# Patient Record
Sex: FEMALE | Race: OTHER | Employment: PART TIME | URBAN - METROPOLITAN AREA
[De-identification: names, ages, dates, MRNs, and addresses within clinical notes are randomized per-mention and may not be internally consistent; named-entity substitution may affect disease eponyms.]

---

## 2017-05-11 ENCOUNTER — HOSPITAL ENCOUNTER (EMERGENCY)
Age: 27
Discharge: HOME | End: 2017-05-11
Attending: EMERGENCY MEDICINE
Payer: SUBSIDIZED

## 2017-05-11 VITALS
HEIGHT: 67 IN | TEMPERATURE: 98.9 F | BODY MASS INDEX: 21.97 KG/M2 | WEIGHT: 140 LBS | DIASTOLIC BLOOD PRESSURE: 54 MMHG | HEART RATE: 98 BPM | RESPIRATION RATE: 18 BRPM | OXYGEN SATURATION: 99 % | SYSTOLIC BLOOD PRESSURE: 100 MMHG

## 2017-05-11 DIAGNOSIS — R50.9 FEVER, UNSPECIFIED FEVER CAUSE: ICD-10-CM

## 2017-05-11 DIAGNOSIS — R51.9 NONINTRACTABLE HEADACHE, UNSPECIFIED CHRONICITY PATTERN, UNSPECIFIED HEADACHE TYPE: Primary | ICD-10-CM

## 2017-05-11 DIAGNOSIS — N30.01 ACUTE CYSTITIS WITH HEMATURIA: ICD-10-CM

## 2017-05-11 LAB
ALBUMIN SERPL BCP-MCNC: 3.7 G/DL (ref 3.5–5)
ALBUMIN/GLOB SERPL: 1 {RATIO} (ref 1.1–2.2)
ALP SERPL-CCNC: 65 U/L (ref 45–117)
ALT SERPL-CCNC: 19 U/L (ref 12–78)
ANION GAP BLD CALC-SCNC: 11 MMOL/L (ref 5–15)
APPEARANCE UR: ABNORMAL
AST SERPL W P-5'-P-CCNC: 9 U/L (ref 15–37)
BACTERIA URNS QL MICRO: ABNORMAL /HPF
BASOPHILS # BLD AUTO: 0 K/UL (ref 0–0.1)
BASOPHILS # BLD: 0 % (ref 0–1)
BILIRUB SERPL-MCNC: 0.6 MG/DL (ref 0.2–1)
BILIRUB UR QL: NEGATIVE
BUN SERPL-MCNC: 12 MG/DL (ref 6–20)
BUN/CREAT SERPL: 16 (ref 12–20)
CALCIUM SERPL-MCNC: 8 MG/DL (ref 8.5–10.1)
CHLORIDE SERPL-SCNC: 104 MMOL/L (ref 97–108)
CO2 SERPL-SCNC: 23 MMOL/L (ref 21–32)
COLOR UR: ABNORMAL
CREAT SERPL-MCNC: 0.77 MG/DL (ref 0.55–1.02)
EOSINOPHIL # BLD: 0 K/UL (ref 0–0.4)
EOSINOPHIL NFR BLD: 0 % (ref 0–7)
EPITH CASTS URNS QL MICRO: ABNORMAL /LPF
ERYTHROCYTE [DISTWIDTH] IN BLOOD BY AUTOMATED COUNT: 12.8 % (ref 11.5–14.5)
GLOBULIN SER CALC-MCNC: 3.7 G/DL (ref 2–4)
GLUCOSE SERPL-MCNC: 126 MG/DL (ref 65–100)
GLUCOSE UR STRIP.AUTO-MCNC: NEGATIVE MG/DL
HCG UR QL: NEGATIVE
HCT VFR BLD AUTO: 39.2 % (ref 35–47)
HGB BLD-MCNC: 13.1 G/DL (ref 11.5–16)
HGB UR QL STRIP: ABNORMAL
KETONES UR QL STRIP.AUTO: ABNORMAL MG/DL
LEUKOCYTE ESTERASE UR QL STRIP.AUTO: ABNORMAL
LIPASE SERPL-CCNC: 118 U/L (ref 73–393)
LYMPHOCYTES # BLD AUTO: 9 % (ref 12–49)
LYMPHOCYTES # BLD: 1 K/UL (ref 0.8–3.5)
MCH RBC QN AUTO: 27.9 PG (ref 26–34)
MCHC RBC AUTO-ENTMCNC: 33.4 G/DL (ref 30–36.5)
MCV RBC AUTO: 83.6 FL (ref 80–99)
MONOCYTES # BLD: 0.4 K/UL (ref 0–1)
MONOCYTES NFR BLD AUTO: 4 % (ref 5–13)
NEUTS SEG # BLD: 8.8 K/UL (ref 1.8–8)
NEUTS SEG NFR BLD AUTO: 87 % (ref 32–75)
NITRITE UR QL STRIP.AUTO: POSITIVE
PH UR STRIP: 5.5 [PH] (ref 5–8)
PLATELET # BLD AUTO: 230 K/UL (ref 150–400)
POTASSIUM SERPL-SCNC: 3.5 MMOL/L (ref 3.5–5.1)
PROT SERPL-MCNC: 7.4 G/DL (ref 6.4–8.2)
PROT UR STRIP-MCNC: ABNORMAL MG/DL
RBC # BLD AUTO: 4.69 M/UL (ref 3.8–5.2)
RBC #/AREA URNS HPF: ABNORMAL /HPF (ref 0–5)
SODIUM SERPL-SCNC: 138 MMOL/L (ref 136–145)
SP GR UR REFRACTOMETRY: >1.03 (ref 1–1.03)
UROBILINOGEN UR QL STRIP.AUTO: 0.2 EU/DL (ref 0.2–1)
WBC # BLD AUTO: 10.2 K/UL (ref 3.6–11)
WBC URNS QL MICRO: ABNORMAL /HPF (ref 0–4)

## 2017-05-11 PROCEDURE — 81001 URINALYSIS AUTO W/SCOPE: CPT | Performed by: EMERGENCY MEDICINE

## 2017-05-11 PROCEDURE — 85025 COMPLETE CBC W/AUTO DIFF WBC: CPT | Performed by: EMERGENCY MEDICINE

## 2017-05-11 PROCEDURE — 96365 THER/PROPH/DIAG IV INF INIT: CPT

## 2017-05-11 PROCEDURE — 96361 HYDRATE IV INFUSION ADD-ON: CPT

## 2017-05-11 PROCEDURE — 99285 EMERGENCY DEPT VISIT HI MDM: CPT

## 2017-05-11 PROCEDURE — 74011000258 HC RX REV CODE- 258: Performed by: EMERGENCY MEDICINE

## 2017-05-11 PROCEDURE — 83690 ASSAY OF LIPASE: CPT | Performed by: EMERGENCY MEDICINE

## 2017-05-11 PROCEDURE — 74011250637 HC RX REV CODE- 250/637: Performed by: EMERGENCY MEDICINE

## 2017-05-11 PROCEDURE — 80053 COMPREHEN METABOLIC PANEL: CPT | Performed by: EMERGENCY MEDICINE

## 2017-05-11 PROCEDURE — 36415 COLL VENOUS BLD VENIPUNCTURE: CPT | Performed by: EMERGENCY MEDICINE

## 2017-05-11 PROCEDURE — 81025 URINE PREGNANCY TEST: CPT

## 2017-05-11 PROCEDURE — 96375 TX/PRO/DX INJ NEW DRUG ADDON: CPT

## 2017-05-11 PROCEDURE — 74011250636 HC RX REV CODE- 250/636: Performed by: EMERGENCY MEDICINE

## 2017-05-11 RX ORDER — SODIUM CHLORIDE 0.9 % (FLUSH) 0.9 %
5-10 SYRINGE (ML) INJECTION AS NEEDED
Status: DISCONTINUED | OUTPATIENT
Start: 2017-05-11 | End: 2017-05-12 | Stop reason: HOSPADM

## 2017-05-11 RX ORDER — ONDANSETRON 2 MG/ML
4 INJECTION INTRAMUSCULAR; INTRAVENOUS
Status: COMPLETED | OUTPATIENT
Start: 2017-05-11 | End: 2017-05-11

## 2017-05-11 RX ORDER — KETOROLAC TROMETHAMINE 30 MG/ML
30 INJECTION, SOLUTION INTRAMUSCULAR; INTRAVENOUS
Status: COMPLETED | OUTPATIENT
Start: 2017-05-11 | End: 2017-05-11

## 2017-05-11 RX ORDER — ONDANSETRON 4 MG/1
4 TABLET, ORALLY DISINTEGRATING ORAL
Qty: 10 TAB | Refills: 0 | Status: SHIPPED | OUTPATIENT
Start: 2017-05-11 | End: 2021-10-11

## 2017-05-11 RX ORDER — ACETAMINOPHEN 500 MG
1000 TABLET ORAL
Status: COMPLETED | OUTPATIENT
Start: 2017-05-11 | End: 2017-05-11

## 2017-05-11 RX ORDER — CEPHALEXIN 500 MG/1
500 CAPSULE ORAL 3 TIMES DAILY
Qty: 21 CAP | Refills: 0 | Status: SHIPPED | OUTPATIENT
Start: 2017-05-11 | End: 2017-05-18

## 2017-05-11 RX ORDER — BUTALBITAL, ASPIRIN, AND CAFFEINE 325; 50; 40 MG/1; MG/1; MG/1
1 CAPSULE ORAL
Qty: 20 CAP | Refills: 0 | Status: SHIPPED | OUTPATIENT
Start: 2017-05-11 | End: 2017-08-09

## 2017-05-11 RX ORDER — SODIUM CHLORIDE 0.9 % (FLUSH) 0.9 %
5-10 SYRINGE (ML) INJECTION EVERY 8 HOURS
Status: DISCONTINUED | OUTPATIENT
Start: 2017-05-11 | End: 2017-05-12 | Stop reason: HOSPADM

## 2017-05-11 RX ADMIN — KETOROLAC TROMETHAMINE 30 MG: 30 INJECTION, SOLUTION INTRAMUSCULAR at 22:19

## 2017-05-11 RX ADMIN — CEFTRIAXONE SODIUM 1 G: 1 INJECTION, POWDER, FOR SOLUTION INTRAMUSCULAR; INTRAVENOUS at 23:14

## 2017-05-11 RX ADMIN — ACETAMINOPHEN 1000 MG: 500 TABLET ORAL at 21:47

## 2017-05-11 RX ADMIN — SODIUM CHLORIDE 1000 ML: 900 INJECTION, SOLUTION INTRAVENOUS at 22:18

## 2017-05-11 RX ADMIN — ONDANSETRON 4 MG: 2 INJECTION INTRAMUSCULAR; INTRAVENOUS at 22:19

## 2017-05-12 NOTE — DISCHARGE INSTRUCTIONS
We hope that we have addressed all of your medical concerns. The examination and treatment you received in the Emergency Department were for an emergent problem and were not intended as complete care. It is important that you follow up with your healthcare provider(s) for ongoing care. If your symptoms worsen or do not improve as expected, and you are unable to reach your usual health care provider(s), you should return to the Emergency Department. Today's healthcare is undergoing tremendous change, and patient satisfaction surveys are one of the many tools to assess the quality of medical care. You may receive a survey from the [employer] regarding your experience in the Emergency Department. I hope that your experience has been completely positive, particularly the medical care that I provided. As such, please participate in the survey; anything less than excellent does not meet my expectations or intentions. [facility] and [address] [place] [address] participate in nationally recognized quality of care measures. If your blood pressure is greater than 120/80, as reported below, we urge that you seek medical care to address the potential of high blood pressure, commonly known as hypertension. Hypertension can be hereditary or can be caused by certain medical conditions, pain, stress, or \"white coat syndrome. \"       Please make an appointment with your health care provider(s) for follow up of your Emergency Department visit. VITALS:   Patient Vitals for the past 8 hrs:   Temp Pulse Resp BP SpO2   05/11/17 2146 - - 22 114/63 99 %   05/11/17 2125 (!) 100.6 °F (38.1 °C) (!) 111 18 (!) 219/133 97 %          Thank you for allowing us to provide you with medical care today. We realize that you have many choices for your emergency care needs. Please choose us in the future for any continued health care needs. Sarah Thibodeaux Rita Islas [address] [place] [address].   Office: [phone]            Recent Results (from the past 24 hour(s))   URINALYSIS W/MICROSCOPIC    Collection Time: 05/11/17 10:06 PM   Result Value Ref Range    Color RED      Appearance CLOUDY (A) CLEAR      Specific gravity >1.030 (H) 1.003 - 1.030    pH (UA) 5.5 5.0 - 8.0      Protein TRACE (A) NEG mg/dL    Glucose NEGATIVE  NEG mg/dL    Ketone TRACE (A) NEG mg/dL    Bilirubin NEGATIVE  NEG      Blood LARGE (A) NEG      Urobilinogen 0.2 0.2 - 1.0 EU/dL    Nitrites POSITIVE (A) NEG      Leukocyte Esterase SMALL (A) NEG      WBC 5-10 0 - 4 /hpf    RBC  0 - 5 /hpf    Epithelial cells FEW FEW /lpf    Bacteria 1+ (A) NEG /hpf   HCG URINE, QL. - POC    Collection Time: 05/11/17 10:08 PM   Result Value Ref Range    Pregnancy test,urine (POC) NEGATIVE  NEG     CBC WITH AUTOMATED DIFF    Collection Time: 05/11/17 10:20 PM   Result Value Ref Range    WBC 10.2 3.6 - 11.0 K/uL    RBC 4.69 3.80 - 5.20 M/uL    HGB 13.1 11.5 - 16.0 g/dL    HCT 39.2 35.0 - 47.0 %    MCV 83.6 80.0 - 99.0 FL    MCH 27.9 26.0 - 34.0 PG    MCHC 33.4 30.0 - 36.5 g/dL    RDW 12.8 11.5 - 14.5 %    PLATELET 266 940 - 513 K/uL    NEUTROPHILS 87 (H) 32 - 75 %    LYMPHOCYTES 9 (L) 12 - 49 %    MONOCYTES 4 (L) 5 - 13 %    EOSINOPHILS 0 0 - 7 %    BASOPHILS 0 0 - 1 %    ABS. NEUTROPHILS 8.8 (H) 1.8 - 8.0 K/UL    ABS. LYMPHOCYTES 1.0 0.8 - 3.5 K/UL    ABS. MONOCYTES 0.4 0.0 - 1.0 K/UL    ABS. EOSINOPHILS 0.0 0.0 - 0.4 K/UL    ABS.  BASOPHILS 0.0 0.0 - 0.1 K/UL   METABOLIC PANEL, COMPREHENSIVE    Collection Time: 05/11/17 10:20 PM   Result Value Ref Range    Sodium 138 136 - 145 mmol/L    Potassium 3.5 3.5 - 5.1 mmol/L    Chloride 104 97 - 108 mmol/L    CO2 23 21 - 32 mmol/L    Anion gap 11 5 - 15 mmol/L    Glucose 126 (H) 65 - 100 mg/dL    BUN 12 6 - 20 MG/DL    Creatinine 0.77 0.55 - 1.02 MG/DL    BUN/Creatinine ratio 16 12 - 20      GFR est AA >60 >60 ml/min/1.73m2    GFR est non-AA >60 >60 ml/min/1.73m2    Calcium 8.0 (L) 8.5 - 10.1 MG/DL    Bilirubin, total 0.6 0.2 - 1.0 MG/DL    ALT (SGPT) 19 12 - 78 U/L    AST (SGOT) 9 (L) 15 - 37 U/L    Alk. phosphatase 65 45 - 117 U/L    Protein, total 7.4 6.4 - 8.2 g/dL    Albumin 3.7 3.5 - 5.0 g/dL    Globulin 3.7 2.0 - 4.0 g/dL    A-G Ratio 1.0 (L) 1.1 - 2.2     LIPASE    Collection Time: 05/11/17 10:20 PM   Result Value Ref Range    Lipase 118 73 - 393 U/L       No results found. Dolor de savita: Instrucciones de cuidado - [ Headache: Care Instructions ]  Instrucciones de cuidado    Los ishmael de savita tienen muchas causas posibles. La mayoría de los ishmael de savita no son señal de un problema más jovan y mejoran por sí solos. El tratamiento en el [facility] podría ayudarlo a sentirse mejor con Jose Eduardo Pace. El médico lo bobo revisado minuciosamente, guillermo puede desarrollar problemas más tarde. Si nota algún problema o síntomas, busque tratamiento médico inmediatamente. La atención de seguimiento es dani parte clave de nieves tratamiento y seguridad. Asegúrese de hacer y acudir a todas las citas, y llame a nieves médico si está teniendo problemas. También es dani buena idea saber los resultados de los exámenes y mantener dani lista de los medicamentos que jamie. ¿Cómo puede cuidarse en el [facility]? · No conduzca si ha tomado analgésicos (medicamentos para el dolor) recetados. · Descanse en un cuarto tranquilo y oscuro hasta que desaparezca el dolor de [place]. Cierre los ojos y trate de relajarse o dormirse. No marciano la televisión ni glo. · Colóquese un paño frío y húmedo o [place] compresa fría sobre la tricia adolorida de 10 a 21 minutos cada vez. Póngase un paño ng entre la compresa fría y la piel.   · Utilice dani toalla húmeda tibia o dani almohadilla térmica ajustada a baja temperatura para relajar los músculos tensos del tara y los hombros.  · [place]ídale a alguien que le latricia masajes suaves en el tara y los hombros.  · [place] los analgésicos exactamente palak le fueron indicados. ¨ Si el médico le recetó un analgésico, tómelo según las indicaciones. ¨ Si no está tomando un analgésico recetado, pregúntele a nieves médico si puede rose jazmine de [employer]. · Tenga cuidado de no rose analgésicos con mayor frecuencia que la permitida en las indicaciones porque los ishmael de savita podrían empeorar o aparecer con mayor frecuencia dani vez que el medicamento pierda nieves [place]. · Preste atención a los nuevos síntomas que aparecen con el dolor de [place], [place], debilidad o entumecimiento, cambios en la visión o confusión. Podrían ser señales de un problema más grave. Para prevenir los ishmael de savita  · [employer] un diario de lior ishmael de savita para que pueda averiguar qué los desencadena. Evitar los desencadenantes podría ayudar a prevenir los ishmael de [place]. Anote cuándo empieza cada dolor de [place], cuánto dura y cómo es el dolor (palpitante, nicko, punzante o sordo). Anote cualquier otro síntoma que haya tenido con el dolor de [place], [place] náuseas, destellos de vega o SHENG, o sensibilidad a la vega brillante o a los ruidos arsen. Anote si el dolor de savita ocurrió cerca de nieves menstruación. Enumere todos los factores que pudieran jeanette desencadenado el dolor de [place], palak ciertos alimentos (chocolate, queso, vino) u olores, humo, luces brillantes, estrés o falta de sueño. · Encuentre maneras saludables de [facility]. Los ishmael de [place] son más comunes jessica o agnieszka después de un momento estresante. Tómese un tiempo para relajarse antes y después de hacer algo que le haya causado un dolor de savita en el pasado. · Trate de mantener lior músculos relajados mediante dani buena postura. Revise si tiene [employer] de la [place], la dima, el tara y los hombros y trate de relajarlos. Cuando se siente en un escritorio, cambie de posición con frecuencia y estírese por 27 segundos cada hora.   · Donita suficiente ejercicio y duerma bastante. · Coma en forma regular y rashawn. Largos períodos sin comer pueden provocar un dolor de savita. · Regálese un masaje. Algunas personas encuentran que los masajes hechos con regularidad son Charmayne Hopkins para aliviar la tensión. · Limite la cafeína. No jeanette demasiado café, té ni sodas. Joann no deje de consumir cafeína de repente, porque eso también puede provocarle ishmael de [place]. · Reduzca la tensión en los ojos a causa de la computadora parpadeando con frecuencia y apartando la mirada de la pantalla a menudo. Asegúrese de tener lentes adecuados y de que nieves monitor esté colocado de manera correcta, palak a un brazo de distancia. · Busque ayuda si tiene depresión o ansiedad. Leanne ishmael de [place] podrían relacionarse con estas afecciones. El tratamiento puede prevenir los ishmael de [place] y ayudar con los síntomas de ansiedad o depresión. ¿Cuándo debe pedir ayuda? Llame al 911 en cualquier momento que piense que puede necesitar atención de emergencia. Por ejemplo, llame si:  · Tiene señales de un ataque cerebral. Estas pueden incluir:  ¨ Parálisis, entumecimiento o debilidad repentinos en la dima, el brazo o la pierna, sobre todo si ocurre en un solo lado del cuerpo. ¨ Cambios repentinos en la visión. ¨ Dificultades repentinas para hablar. ¨ Confusión repentina o dificultad para comprender frases sencillas. ¨ Problemas repentinos para caminar o mantener el equilibrio. ¨ Dolor de [place] intenso y repentino, distinto de los ishmael de savita anteriores. Llame a nieves médico ahora mismo o busque atención médica inmediata si:  · Tiene un dolor de savita nuevo o peor. · Nieves dolor de savita [facility]. ¿Dónde puede encontrar más información en inglés? Hina Berry a http://homero-ra.info/. Escriba N159 en la búsqueda para aprender más acerca de \"Dolor de savita: Instrucciones de cuidado - [ Headache: Care Instructions ]. \"  Revisado: 14 octubre, 2016  Versión del contenido: 11.2  © 4713-4050 [employer]. Las instrucciones de cuidado fueron adaptadas bajo licencia por [employer] (which disclaims liability or warranty for this information). Si usted tiene [place] afección médica o sobre estas instrucciones, siempre pregunte a nieves profesional de adriane. Healthwise, Incorporated niega toda garantía o responsabilidad por nieves uso de esta información. Aprenda sobre la fiebre - [ Learning About Fever ]  ¿Qué es la fiebre? La fiebre es dani temperatura corporal surjit. Es dani de las [employer] que el cuerpo combate enfermedades. La fiebre indica que el cuerpo está reaccionando a dani infección o a otras enfermedades, tanto leves palak graves. La fiebre es un síntoma, no dani enfermedad en sí misma. La fiebre puede ser dani señal de que usted está enfermo, joann la mayoría de las fiebres no están causadas por un problema grave. Es posible que usted tenga fiebre con dani enfermedad de yordy importancia, palak un resfriado. Joann, en ocasiones, dani infección muy grave puede causar poca o nada de fiebre. Es importante considerar otros síntomas, otras afecciones que usted tenga y cómo se siente usted en general. En niños, preste atención a nieves comportamiento y a los síntomas de los que se [place]. ¿Cuál es la temperatura normal del cuerpo? Dani temperatura corporal normal es de 98.6°F (37°C), aproximadamente. Algunas personas tienen dani temperatura normal que es un poco más surjit o algo más baja que esta. Nieves temperatura puede ser un poco más baja en la mañana que más tarde en el día. Podría elevarse cuando hace ejercicio, lleva ropa gruesa, jamie un baño caliente o cuando hace calor. Nieves temperatura también puede ser diferente dependiendo de cómo la mida. Si mide la temperatura en la boca (oral) o en la axila, puede ser algo más baja que la temperatura central (rectal). ¿Qué es dani temperatura de fiebre?   Dani temperatura central de 100.4°F (38°C) o superior se considera fiebre. ¿Qué puede causar la fiebre? La fiebre puede ser causada por:  · Infecciones. Esta es la causa más común de la [place]. Los ejemplos de infecciones que pueden provocar fiebre incluyen la gripe, dani infección de los riñones o la neumonía. · Algunos medicamentos. · Traumatismos o lesiones graves, palak un ataque al corazón, un ataque cerebral, insolación o quemaduras. · Otras afecciones médicas, palak artritis y algunos tipos de cáncer. ¿Cómo puede tratar la fiebre en el hogar? · Pregúntele a nieves médico si puede rose un analgésico (medicamento para el dolor) de venta luis, palak acetaminofén (Tylenol), ibuprofeno (Advil, Motrin) o naproxeno (Aleve). Sea vashti con los medicamentos. Munira y siga todas las instrucciones de la [place]. · Anjali abundantes líquidos para prevenir la deshidratación. Opte por beber agua y otros líquidos trish sin cafeína hasta que se sienta mejor. Si tiene dani enfermedad renal, cardíaca o hepática y tiene que restringir los líquidos, hable con nieves médico antes de aumentar la cantidad de líquido que kit. La atención de seguimiento es dani parte clave de nieves tratamiento y seguridad. Asegúrese de hacer y acudir a todas las citas, y llame a nieves médico si está teniendo problemas. También es dani buena idea saber los resultados de lior exámenes y mantener dani lista de los medicamentos que jamie. ¿Dónde puede encontrar más información en inglés? Marcelo Ruffin a http://homero-ra.info/. Evy PIERCE en la búsqueda para aprender más acerca de \"Aprenda sobre la [place] - [ Learning About Fever ]. \"  Revisado: 27 kovacs, 2016  Versión del contenido: 11.2  © 1258-7590 [employer], [employer]. Las instrucciones de cuidado fueron adaptadas bajo licencia por [employer] (which disclaims liability or warranty for this information). Si usted tiene [place] afección médica o sobre estas instrucciones, siempre pregunte a nieves profesional de adriane.  [employer] Incorporated niega toda garantía o responsabilidad por nieves uso de esta información. Infección urinaria en las mujeres: Instrucciones de cuidado - [ Urinary Tract Infection in Women: Care Instructions ]  Instrucciones de cuidado    Dani infección urinaria (UTI, por lior siglas en inglés) es un término general que hace referencia a dani infección que se produce en cualquier parte entre los riñones y la uretra (conducto por el cual se expulsa la orina). La mayoría de las UTI son infecciones de la vejiga. Con frecuencia, causan dolor o ardor al [place]ime. Las UTI son causadas por bacterias y pueden curarse con antibióticos. Asegúrese de completar el tratamiento para que la infección desaparezca. La atención de seguimiento es dani parte clave de nieves tratamiento y seguridad. Asegúrese de hacer y acudir a todas las citas, y llame a nieves médico si está teniendo problemas. También es dani buena idea saber los resultados de lior exámenes y mantener dani lista de los medicamentos que jaime. ¿Cómo puede cuidarse en el hogar? · [address] No deje de tomarlos por el hecho de sentirse mejor. Debe rose todos los antibióticos hasta terminarlos. · Romina los próximos jazmine o [address], anjali mayor cantidad de [place] y otros líquidos. [place] puede ayudar a eliminar las bacterias que provocan la infección. (Si tiene dani enfermedad de los riñones, el corazón o el hígado y tiene que [place]'s líquidos, hable con nieves médico antes de aumentar nieves consumo). · Evite las bebidas gaseosas o con cafeína. Pueden irritar la vejiga. · Orine con frecuencia. Trate de vaciar la vejiga cada vez que orine. · Para aliviar el dolor, tome un baño caliente o colóquese dani almohadilla térmica a baja temperatura sobre la parte baja del abdomen o la tricia genital. Nunca se duerma mientras [place] dani almohadilla térmica. Para prevenir las infecciones urinarias  · Anjali abundante agua todos los días.  [place] la ayuda a orinar con frecuencia, lo que elimina las bacterias de nieves organismo. (Si tiene dani enfermedad de los riñones, el corazón o el hígado y tiene que Melanie's líquidos, hable con nieves médico antes de aumentar nieves consumo). · Orine cuando necesite hacerlo. · Orine inmediatamente después de jeanette tenido [employer]. · Cámbiese las toallas sanitarias con frecuencia. · Evite el uso de lavados vaginales, los kwabena de burbujas, los [place] de higiene femenina y otros productos para la higiene femenina que contengan desodorantes. · Después de ir al baño, límpiese de adelante hacia atrás. ¿Cuándo debe pedir ayuda? Llame a nieves médico ahora mismo o busque atención médica inmediata si:  · Aparecen síntomas palak fiebre, escalofríos, náuseas o vómito por Lavelle Pisano, o empeoran. · Tiene un nuevo dolor de espalda agnieszka debajo de las [place]. [place] se llama dolor en el flanco.  · Tiene anna o pus nuevos en la orina. · Tiene problemas con nieves antibiótico.  Preste especial atención a los cambios en nieves adriane y asegúrese de comunicarse con nieves médico si:  · No mejora después de jeanette tomado un antibiótico jessica 2 días. · Leanne síntomas desaparecen y [employer]. ¿Dónde puede encontrar más información en inglés? [place] Segundo a http://homero-ra.info/. Anabela Speaks O497 en la búsqueda para aprender más acerca de \"Infección urinaria en las mujeres: Instrucciones de cuidado - [ Urinary Tract Infection in Women: Care Instructions ]. \"  Revisado: 28 noviembre, 2016  Versión del contenido: 11.2  © 3375-5861 [employer], [employer]. Las instrucciones de cuidado fueron adaptadas bajo licencia por [employer] (which disclaims liability or warranty for this information). Si usted tiene [place] afección médica o sobre estas instrucciones, siempre pregunte a nieves profesional de adriane. Healthwise, Incorporated niega toda garantía o responsabilidad por nieves uso de esta información.

## 2017-05-12 NOTE — ED TRIAGE NOTES
Pt arrives with c/o of headache,  Fever & vomiting  x 1 day. Pt has taken excedrin at 13:00, with no relief. Pt does not speak English boyfriend and sister are interpreting.

## 2017-05-12 NOTE — ED PROVIDER NOTES
HPI Comments: 28 y.o. female with no significant past medical history who presents from home with chief complaint of headache. Pt is primarily [address] speaking, hx translated by significant other. Pt reports gradually worsening symptoms of generalized headache since last night. Pt describes pain as \"throbbing\" throughout skull accompanied by nausea, vomiting, diffuse abdominal pain and subjective fever. Pt notes the use of Excedrin this afternoon without relief. Pt dennies neck pain, diarrhea or dysuria. NKDA. She is currently on her menstrual cycle. There are no other acute medical concerns at this time. PCP: No primary care provider on file. Note written by Ari Arreola, as dictated by David Brooks DO 10:15 PM      The history is provided by the patient and a significant other. A  was used. History reviewed. No pertinent past medical history. History reviewed. No pertinent surgical history. History reviewed. No pertinent family history. Social History     Social History    Marital status: N/A     Spouse name: N/A    Number of children: N/A    Years of education: N/A     Occupational History    Not on file. Social History Main Topics    Smoking status: Never Smoker    Smokeless tobacco: Not on file    Alcohol use No    Drug use: Not on file    Sexual activity: Not on file     Other Topics Concern    Not on file     Social History Narrative    No narrative on file     ALLERGIES: Review of patient's allergies indicates no known allergies. Review of Systems   Constitutional: Positive for fever. Negative for appetite change, chills and unexpected weight change. HENT: Negative for ear pain, hearing loss, rhinorrhea and trouble swallowing. Eyes: Negative for pain and visual disturbance. Respiratory: Negative for cough, chest tightness and shortness of breath. Cardiovascular: Negative for chest pain and palpitations. Gastrointestinal: Positive for abdominal pain, nausea and vomiting. Negative for abdominal distention, blood in stool and diarrhea. Genitourinary: Negative for dysuria, hematuria and urgency. Musculoskeletal: Negative for back pain and myalgias. Skin: Negative for rash. Neurological: Positive for headaches. Negative for dizziness, syncope, weakness and numbness. Psychiatric/Behavioral: Negative for confusion and suicidal ideas. All other systems reviewed and are negative. Vitals:    05/11/17 2125 05/11/17 2146   BP: (!) 219/133 114/63   Pulse: (!) 111    Resp: 18 22   Temp: (!) 100.6 °F (38.1 °C)    SpO2: 97% 99%   Weight: 63.5 kg (140 lb)    Height: 5' 7\" (1.702 m)             Physical Exam   Constitutional: She is oriented to person, place, and time. She appears well-developed and well-nourished. No distress. HENT:   Head: Normocephalic and atraumatic. Right Ear: External ear normal.   Left Ear: External ear normal.   Nose: Nose normal.   Mouth/Throat: Oropharynx is clear and moist. No oropharyngeal exudate. Mildly dry oromucosa   Eyes: Conjunctivae and EOM are normal. Pupils are equal, round, and reactive to light. Right eye exhibits no discharge. Left eye exhibits no discharge. No scleral icterus. Fundoscopic exam:       The right eye shows no papilledema. The left eye shows no papilledema. Normal venous pulsations. Neck: Normal range of motion. Neck supple. No JVD present. No tracheal deviation present. No Brudzinski's sign and no Kernig's sign noted. Cardiovascular: Regular rhythm, normal heart sounds and intact distal pulses. Tachycardia present. Exam reveals no gallop and no friction rub. No murmur heard. Pulmonary/Chest: Effort normal and breath sounds normal. No stridor. No respiratory distress. She has no decreased breath sounds. She has no wheezes. She has no rhonchi. She has no rales. She exhibits no tenderness. Abdominal: Soft.  Bowel sounds are normal. She exhibits no distension. There is tenderness (minimally diffuse). There is no rebound and no guarding. Musculoskeletal: Normal range of motion. She exhibits no edema or tenderness. Neurological: She is alert and oriented to person, place, and time. She has normal strength and normal reflexes. No cranial nerve deficit or sensory deficit. She exhibits normal muscle tone. Coordination normal. GCS eye subscore is 4. GCS verbal subscore is 5. GCS motor subscore is 6. Skin: Skin is warm and dry. No rash noted. She is not diaphoretic. No erythema. No pallor. Psychiatric: She has a normal mood and affect. Her behavior is normal. Judgment and thought content normal.   Nursing note and vitals reviewed. Note written by Ari Mcallister, as dictated by Eitan Zhang DO 10:16 PM    MDM  Number of Diagnoses or Management Options  Acute cystitis with hematuria:   Fever, unspecified fever cause:   Nonintractable headache, unspecified chronicity pattern, unspecified headache type:      Amount and/or Complexity of Data Reviewed  Clinical lab tests: ordered and reviewed    Risk of Complications, Morbidity, and/or Mortality  Presenting problems: moderate  Diagnostic procedures: low  Management options: moderate    Patient Progress  Patient progress: improved    ED Course     11:14 PM  Pt has a UTI. Will treat here and discharge home to f/u with PCP. Patient's results have been reviewed with them. Patient and/or family have verbally conveyed their understanding and agreement of the patient's signs, symptoms, diagnosis, treatment and prognosis and additionally agree to follow up as recommended or return to the Emergency Room should their condition change prior to follow-up.   Discharge instructions have also been provided to the patient with some educational information regarding their diagnosis as well a list of reasons why they would want to return to the ER prior to their follow-up appointment should their condition change. Procedures   Chief Complaint   Patient presents with    Headache       2:16 AM  The patients presenting problems have been discussed, and they are in agreement with the care plan formulated and outlined with them. I have encouraged them to ask questions as they arise throughout their visit.     MEDICATIONS GIVEN:  Medications   sodium chloride (NS) flush 5-10 mL (not administered)   sodium chloride (NS) flush 5-10 mL (not administered)   acetaminophen (TYLENOL) tablet 1,000 mg (1,000 mg Oral Given 5/11/17 2147)   sodium chloride 0.9 % bolus infusion 1,000 mL (0 mL IntraVENous IV Completed 5/11/17 2316)   ketorolac (TORADOL) injection 30 mg (30 mg IntraVENous Given 5/11/17 2219)   ondansetron (ZOFRAN) injection 4 mg (4 mg IntraVENous Given 5/11/17 2219)   cefTRIAXone (ROCEPHIN) 1 g in 0.9% sodium chloride (MBP/ADV) 50 mL (0 g IntraVENous IV Completed 5/11/17 2336)       LABS REVIEWED:  Recent Results (from the past 24 hour(s))   URINALYSIS W/MICROSCOPIC    Collection Time: 05/11/17 10:06 PM   Result Value Ref Range    Color RED      Appearance CLOUDY (A) CLEAR      Specific gravity >1.030 (H) 1.003 - 1.030    pH (UA) 5.5 5.0 - 8.0      Protein TRACE (A) NEG mg/dL    Glucose NEGATIVE  NEG mg/dL    Ketone TRACE (A) NEG mg/dL    Bilirubin NEGATIVE  NEG      Blood LARGE (A) NEG      Urobilinogen 0.2 0.2 - 1.0 EU/dL    Nitrites POSITIVE (A) NEG      Leukocyte Esterase SMALL (A) NEG      WBC 5-10 0 - 4 /hpf    RBC  0 - 5 /hpf    Epithelial cells FEW FEW /lpf    Bacteria 1+ (A) NEG /hpf   HCG URINE, QL. - POC    Collection Time: 05/11/17 10:08 PM   Result Value Ref Range    Pregnancy test,urine (POC) NEGATIVE  NEG     CBC WITH AUTOMATED DIFF    Collection Time: 05/11/17 10:20 PM   Result Value Ref Range    WBC 10.2 3.6 - 11.0 K/uL    RBC 4.69 3.80 - 5.20 M/uL    HGB 13.1 11.5 - 16.0 g/dL    HCT 39.2 35.0 - 47.0 %    MCV 83.6 80.0 - 99.0 FL    MCH 27.9 26.0 - 34.0 PG    MCHC 33.4 30.0 - 36.5 g/dL    RDW 12.8 11.5 - 14.5 %    PLATELET 645 748 - 618 K/uL    NEUTROPHILS 87 (H) 32 - 75 %    LYMPHOCYTES 9 (L) 12 - 49 %    MONOCYTES 4 (L) 5 - 13 %    EOSINOPHILS 0 0 - 7 %    BASOPHILS 0 0 - 1 %    ABS. NEUTROPHILS 8.8 (H) 1.8 - 8.0 K/UL    ABS. LYMPHOCYTES 1.0 0.8 - 3.5 K/UL    ABS. MONOCYTES 0.4 0.0 - 1.0 K/UL    ABS. EOSINOPHILS 0.0 0.0 - 0.4 K/UL    ABS. BASOPHILS 0.0 0.0 - 0.1 K/UL   METABOLIC PANEL, COMPREHENSIVE    Collection Time: 05/11/17 10:20 PM   Result Value Ref Range    Sodium 138 136 - 145 mmol/L    Potassium 3.5 3.5 - 5.1 mmol/L    Chloride 104 97 - 108 mmol/L    CO2 23 21 - 32 mmol/L    Anion gap 11 5 - 15 mmol/L    Glucose 126 (H) 65 - 100 mg/dL    BUN 12 6 - 20 MG/DL    Creatinine 0.77 0.55 - 1.02 MG/DL    BUN/Creatinine ratio 16 12 - 20      GFR est AA >60 >60 ml/min/1.73m2    GFR est non-AA >60 >60 ml/min/1.73m2    Calcium 8.0 (L) 8.5 - 10.1 MG/DL    Bilirubin, total 0.6 0.2 - 1.0 MG/DL    ALT (SGPT) 19 12 - 78 U/L    AST (SGOT) 9 (L) 15 - 37 U/L    Alk. phosphatase 65 45 - 117 U/L    Protein, total 7.4 6.4 - 8.2 g/dL    Albumin 3.7 3.5 - 5.0 g/dL    Globulin 3.7 2.0 - 4.0 g/dL    A-G Ratio 1.0 (L) 1.1 - 2.2     LIPASE    Collection Time: 05/11/17 10:20 PM   Result Value Ref Range    Lipase 118 73 - 393 U/L       VITAL SIGNS:  Patient Vitals for the past 12 hrs:   Temp Pulse Resp BP SpO2   05/11/17 2326 98.9 °F (37.2 °C) 98 18 100/54 99 %   05/11/17 2146 - - 22 114/63 99 %   05/11/17 2125 (!) 100.6 °F (38.1 °C) (!) 111 18 (!) 219/133 97 %       RADIOLOGY RESULTS:  The following have been ordered and reviewed:  No orders to display     PROGRESS NOTES:  Pt feeling better. Discussed results and plan with patient. Patient will be discharged home with PCP followup. Patient instructed to return to the emergency room for any worsening symptoms or any other concerns. DIAGNOSIS:    1. Nonintractable headache, unspecified chronicity pattern, unspecified headache type    2.  Fever, unspecified fever cause    3. Acute cystitis with hematuria        PLAN:  Follow-up Information     Follow up With Details Comments [address] [facility] In 1 week  [address] [facility] [address] [phone]    [facility] EMERGENCY DEPT  If symptoms worsen [address] [facility]  [phone]        Discharge Medication List as of 5/11/2017 11:18 PM      START taking these medications    Details   cephALEXin (KEFLEX) 500 mg capsule Take 1 Cap by mouth three (3) times daily for 7 days. , Print, Disp-21 Cap, R-0      butalbital-aspirin-caffeine (FIORINAL) capsule Take 1 Cap by mouth every four (4) hours as needed for Pain for up to 90 days. Max Daily Amount: 6 Caps. Maximum dose 6 capsules daily, Print, Disp-20 Cap, R-0      ondansetron (ZOFRAN ODT) 4 mg disintegrating tablet Take 1 Tab by mouth every eight (8) hours as needed for Nausea. , Print, Disp-10 Tab, R-0               ED COURSE: The patients hospital course has been uncomplicated.

## 2021-07-13 NOTE — PROGRESS NOTES
Chief Complaint   Fibroids      HPI  Jaycee Gamble is a 27 y.o. female who presents for concerns of fibroids. She is having bilateral pelvic pain off and on. .Patient's last menstrual period was 06/24/2021 (approximate). The patient states she had a US at [facility] recently but was diagnosed with fibroids years ago. We have no records. Location of pain: bilateral pelvic pain   They started 8-9 years ago. Since then they have become gradually worse. Associated symptoms: painful intercourse. Alleviating factors: pain medications such as Aleve. No past medical history on file. No past surgical history on file. Social History     Occupational History    Not on file   Tobacco Use    Smoking status: Never Smoker   Substance and Sexual Activity    Alcohol use: No    Drug use: Not on file    Sexual activity: Not on file     No family history on file. No Known Allergies  Prior to Admission medications    Medication Sig Start Date End Date Taking? Authorizing Provider   ondansetron (ZOFRAN ODT) 4 mg disintegrating tablet Take 1 Tab by mouth every eight (8) hours as needed for Nausea.  5/11/17   Sina Connolly DO        Review of Systems: History obtained from the patient  Constitutional: negative for weight loss, fever, night sweats  HEENT: negative for hearing loss, earache, congestion, snoring, sorethroat  CV: negative for chest pain, palpitations, edema  Resp: negative for cough, shortness of breath, wheezing  Breast: negative for breast lumps, nipple discharge, galactorrhea  GI: negative for change in bowel habits, abdominal pain, black or bloody stools  : negative for frequency, dysuria, hematuria, vaginal discharge  MSK: negative for back pain, joint pain, muscle pain  Skin: negative for itching, rash, hives  Neuro: negative for dizziness, headache, confusion, weakness  Psych: negative for anxiety, depression, change in mood  Heme/lymph: negative for bleeding, bruising, pallor    Objective:  Visit Vitals  /64   Ht 5' 6\" (1.676 m)   Wt 151 lb (68.5 kg)   LMP 06/24/2021 (Approximate)   BMI 24.37 kg/m²       Physical Exam:   PHYSICAL EXAMINATION    Constitutional  · Appearance: well-nourished, well developed, alert, in no acute distress    HENT  · Head and Face: appears normal    Neck  · Inspection/Palpation: normal appearance, no masses or tenderness  · Lymph Nodes: no lymphadenopathy present  · Thyroid: gland size normal, nontender, no nodules or masses present on palpation    Chest  · Respiratory Effort: breathing unlabored  · Auscultation:     Cardiovascular  · Heart:  · Auscultation:        Gastrointestinal  · Abdominal Examination: abdomen non-tender to palpation, normal bowel sounds, no masses present  · Liver and spleen: no hepatomegaly present, spleen not palpable  · Hernias: no hernias identified    Genitourinary  · External Genitalia: normal appearance for age, no discharge present, no tenderness present, no inflammatory lesions present, no masses present, no atrophy present  · Vagina: normal vaginal vault without central or paravaginal defects, no discharge present, no inflammatory lesions present, no masses present  · Bladder: non-tender to palpation  · Urethra: appears normal  · Cervix: normal   · Uterus: normal size, shape and consistency  · Adnexa: no adnexal tenderness present, no adnexal masses present  · Perineum: perineum within normal limits, no evidence of trauma, no rashes or skin lesions present  · Anus: anus within normal limits, no hemorrhoids present  · Inguinal Lymph Nodes: no lymphadenopathy present    Skin  · General Inspection: no rash, no lesions identified    Neurologic/Psychiatric  · Mental Status:  · Orientation: grossly oriented to person, place and time  · Mood and Affect: mood normal, affect appropriate    Assessment:   Pelvic pain, intermittent   Fibroids per pt report    Plan:   Nuswab sent  Will try to get records of her [address] at [facility]      RTO prn if symptoms persist or worsen. Instructions given to pt. Handouts given to pt.

## 2021-07-14 ENCOUNTER — OFFICE VISIT (OUTPATIENT)
Dept: OBGYN CLINIC | Age: 31
End: 2021-07-14
Payer: SUBSIDIZED

## 2021-07-14 VITALS
HEIGHT: 66 IN | WEIGHT: 151 LBS | DIASTOLIC BLOOD PRESSURE: 64 MMHG | SYSTOLIC BLOOD PRESSURE: 120 MMHG | BODY MASS INDEX: 24.27 KG/M2

## 2021-07-14 DIAGNOSIS — D21.9 FIBROIDS: ICD-10-CM

## 2021-07-14 DIAGNOSIS — R10.2 PELVIC PAIN: ICD-10-CM

## 2021-07-14 DIAGNOSIS — R10.2 VAGINAL PAIN: Primary | ICD-10-CM

## 2021-07-14 PROCEDURE — 99202 OFFICE O/P NEW SF 15 MIN: CPT | Performed by: OBSTETRICS & GYNECOLOGY

## 2021-07-14 NOTE — PATIENT INSTRUCTIONS
Fibromas uterinos: Instrucciones de cuidado  Uterine Fibroids: Care Instructions  Instrucciones de cuidado    Los fibromas uterinos son crecimientos en el útero. Los fibromas no son cáncer. Los médicos no conocen la causa de los fibromas. Son muy comunes en las mujeres jessica la edad reproductiva. Los fibromas pueden aparecer dentro del útero, en la pared muscular del mismo o cerca de nieves pared exterior. En algunas mujeres, los fibromas causan cólicos (retortijones) dolorosos y menstruaciones con sangrado intenso. En estos casos, rose medicamentos antiinflamatorios, pastillas anticonceptivas o usar un dispositivo intrauterino (DIU), a menudo ayuda a reducir los síntomas. Algunas veces, se necesita cirugía para tratar los fibromas. Sin embargo, si se está acercando a la menopausia, quizá desee esperar para alyssa si los síntomas mejoran. La mayoría de los fibromas se encogen y desaparecen después de la menopausia, cuando los períodos menstruales se detienen por completo. La atención de seguimiento es dani parte clave de nieves tratamiento y seguridad. Asegúrese de hacer y acudir a todas las citas, y llame a nieves médico si está teniendo problemas. También es dani buena idea saber los resultados de lior exámenes y mantener dani lista de los medicamentos que jamie. Cómo puede cuidarse en el hogar? · Si nieves médico le recetó un medicamento, tómelo exactamente palak le fue recetado. Llame a nieves médico si carolina estar teniendo problemas con nieves medicamento. · [place] antiinflamatorios para el dolor. Estos incluyen ibuprofeno (Advil, Motrin) y naproxeno (Aleve). Munira y siga todas las instrucciones de la [place]. · Utilice calor, por ejemplo, de dani bolsa de [place] o dani almohadilla térmica ajustada a temperatura baja, o un baño tibio para relajar los músculos tensos y [employer]. Póngase un paño ng entre la almohadilla térmica y la piel. Nunca se duerma mientras [place] dani almohadilla térmica.   · Acuéstese y [address] dani almohada debajo de las rodillas. O acuéstese de lado y [place] [address]. Estas posiciones pueden ayudar a aliviar el dolor o la presión en el abdomen. · Lleve la cuenta de la cantidad de toallas sanitarias o tampones que [place] cada día. · Donita por lo menos 30 minutos de ejercicio la mayoría de los días de la [place]. Caminar es dani buena opción. Es posible que también quiera hacer otras actividades, palak correr, nadar, [employer], o jugar al tenis u otros deportes de equipo. · Si sangra por más tiempo que de costumbre o tiene sangrado abundante, tome un multivitamínico con adelaida a diario. Cuándo debes pedir ayuda? Llama a tu médico ahora mismo o busca atención médica inmediata si:    · Tienes sangrado vaginal intenso. Gillis significa que empapas las toallas sanitarias o los tampones que sueles usar cada hora, jessica 2 o más horas. Presta especial atención a los cambios en tu adriane y asegúrate de comunicarte con tu médico si:    · Tienes más sangrado vaginal, o el sangrado es más irregular. Dónde puede encontrar más información en inglés? Vaya a http://www.gray.com/  Chris B121 en la búsqueda para aprender más acerca de \"Fibromas uterinos: Instrucciones de cuidado. \"  Revisado: 17 julio, 9289               GSDYRVD del contenido: 12.8  © 2006-2021 Healthwise, Incorporated. Las instrucciones de cuidado fueron adaptadas bajo licencia por [employer] (which disclaims liability or warranty for this information). Si usted tiene [place] afección médica o sobre estas instrucciones, siempre pregunte a nieves profesional de adriane. Healthwise, Incorporated niega toda garantía o responsabilidad por nieves uso de esta información.

## 2021-07-17 LAB
A VAGINAE DNA VAG QL NAA+PROBE: ABNORMAL SCORE
BVAB2 DNA VAG QL NAA+PROBE: ABNORMAL SCORE
C ALBICANS DNA VAG QL NAA+PROBE: NEGATIVE
C GLABRATA DNA VAG QL NAA+PROBE: NEGATIVE
C TRACH DNA VAG QL NAA+PROBE: NEGATIVE
MEGA1 DNA VAG QL NAA+PROBE: ABNORMAL SCORE
N GONORRHOEA DNA VAG QL NAA+PROBE: NEGATIVE
T VAGINALIS DNA VAG QL NAA+PROBE: NEGATIVE

## 2021-07-20 NOTE — PROGRESS NOTES
Her vaginal culture is positive for BV. She can take Flagyl 500 mg BID for a week.  We are still waiting for records of her ultrasound at [facility].

## 2021-07-22 RX ORDER — METRONIDAZOLE 500 MG/1
500 TABLET ORAL 2 TIMES DAILY
Qty: 14 TABLET | Refills: 0 | Status: SHIPPED | OUTPATIENT
Start: 2021-07-22 | End: 2021-07-29

## 2021-07-29 ENCOUNTER — HOSPITAL ENCOUNTER (OUTPATIENT)
Dept: LAB | Age: 31
Discharge: HOME | End: 2021-07-29

## 2021-07-29 LAB
BASOPHILS # BLD: 0.1 K/UL (ref 0–0.1)
BASOPHILS NFR BLD: 1 % (ref 0–1)
CHOLEST SERPL-MCNC: 157 MG/DL
DIFFERENTIAL METHOD BLD: ABNORMAL
EOSINOPHIL # BLD: 0.1 K/UL (ref 0–0.4)
EOSINOPHIL NFR BLD: 2 % (ref 0–7)
ERYTHROCYTE [DISTWIDTH] IN BLOOD BY AUTOMATED COUNT: 13 % (ref 11.5–14.5)
HCT VFR BLD AUTO: 39.8 % (ref 35–47)
HDLC SERPL-MCNC: 67 MG/DL
HDLC SERPL: 2.3 {RATIO} (ref 0–5)
HGB BLD-MCNC: 13.3 G/DL (ref 11.5–16)
IMM GRANULOCYTES # BLD AUTO: 0 K/UL (ref 0–0.04)
IMM GRANULOCYTES NFR BLD AUTO: 1 % (ref 0–0.5)
LDLC SERPL CALC-MCNC: 70.6 MG/DL (ref 0–100)
LYMPHOCYTES # BLD: 2 K/UL (ref 0.8–3.5)
LYMPHOCYTES NFR BLD: 37 % (ref 12–49)
MCH RBC QN AUTO: 28.2 PG (ref 26–34)
MCHC RBC AUTO-ENTMCNC: 33.4 G/DL (ref 30–36.5)
MCV RBC AUTO: 84.5 FL (ref 80–99)
MONOCYTES # BLD: 0.6 K/UL (ref 0–1)
MONOCYTES NFR BLD: 11 % (ref 5–13)
NEUTS SEG # BLD: 2.7 K/UL (ref 1.8–8)
NEUTS SEG NFR BLD: 48 % (ref 32–75)
NRBC # BLD: 0 K/UL (ref 0–0.01)
NRBC BLD-RTO: 0 PER 100 WBC
PLATELET # BLD AUTO: 256 K/UL (ref 150–400)
PMV BLD AUTO: 10.7 FL (ref 8.9–12.9)
RBC # BLD AUTO: 4.71 M/UL (ref 3.8–5.2)
TRIGL SERPL-MCNC: 97 MG/DL (ref ?–150)
VLDLC SERPL CALC-MCNC: 19.4 MG/DL
WBC # BLD AUTO: 5.4 K/UL (ref 3.6–11)

## 2021-07-29 PROCEDURE — 80061 LIPID PANEL: CPT

## 2021-07-29 PROCEDURE — 85025 COMPLETE CBC W/AUTO DIFF WBC: CPT

## 2021-09-03 ENCOUNTER — TELEPHONE (OUTPATIENT)
Dept: OBGYN CLINIC | Age: 31
End: 2021-09-03

## 2021-09-03 NOTE — TELEPHONE ENCOUNTER
Cara Rodgers from [facility] calling in regards to pt and external records. This RN confirmed that we did not receive any other records besides a referral. Cara Rodgers said she will refax paperwork. This RN provided fax number.

## 2021-10-07 NOTE — PROGRESS NOTES
Chief Complaint   Vaginal Pain      HPI  Israel Jimenez is a 27 y.o. female who presents for the evaluation of cystic fibroma. Patient's last menstrual period was 09/27/2021 (approximate). The patient was last seen in office on 7/14/21 for vaginal and pelvic pain. Patient has a hx of ? cystic fibroma on cervix and ovaries. She was [place]. She's having on going issues with pelvic pain during sexual intercourse. She is also having intermittent lower abdominal pain. Patient stated at her last visit she had an ultrasound through [facility] on 5/6/21. Her US is normal.    She just started the pill 2 weeks ago. Pain with intercourse is usually vaginal walls. Pain not related to her cycle. No past medical history on file. No past surgical history on file. Social History     Occupational History    Not on file   Tobacco Use    Smoking status: Never Smoker    Smokeless tobacco: Never Used   Vaping Use    Vaping Use: Never used   Substance and Sexual Activity    Alcohol use: No    Drug use: Never    Sexual activity: Yes     Partners: Male     No family history on file. No Known Allergies  Prior to Admission medications    Medication Sig Start Date End Date Taking? Authorizing Provider   ondansetron (ZOFRAN ODT) 4 mg disintegrating tablet Take 1 Tab by mouth every eight (8) hours as needed for Nausea.   Patient not taking: Reported on 7/14/2021 5/11/17 10/11/21  Vanessa Suggs DO        Review of Systems: History obtained from the patient  Constitutional: negative for weight loss, fever, night sweats  HEENT: negative for hearing loss, earache, congestion, snoring, sorethroat  CV: negative for chest pain, palpitations, edema  Resp: negative for cough, shortness of breath, wheezing  Breast: negative for breast lumps, nipple discharge, galactorrhea  GI: negative for change in bowel habits, abdominal pain, black or bloody stools  : negative for frequency, dysuria, hematuria, vaginal discharge  MSK: negative for back pain, joint pain, muscle pain  Skin: negative for itching, rash, hives  Neuro: negative for dizziness, headache, confusion, weakness  Psych: negative for anxiety, depression, change in mood  Heme/lymph: negative for bleeding, bruising, pallor    Objective:  Visit Vitals  /60   Wt 150 lb (68 kg)   LMP 09/27/2021 (Approximate)   BMI 24.21 kg/m²       Physical Exam:   PHYSICAL EXAMINATION    Constitutional  · Appearance: well-nourished, well developed, alert, in no acute distress      Neurologic/Psychiatric  · Mental Status:  · Orientation: grossly oriented to person, place and time  · Mood and Affect: mood normal, affect appropriate    Assessment:   Dyspareunia  Lower abdominal pain, intermittent   Normal US    Plan:   See if starting the pill helps her pain  Lubricants with intercourse  Consider [place] if pain persists      RTO prn if symptoms persist or worsen. Instructions given to pt.

## 2021-10-11 ENCOUNTER — OFFICE VISIT (OUTPATIENT)
Dept: OBGYN CLINIC | Age: 31
End: 2021-10-11
Payer: SUBSIDIZED

## 2021-10-11 VITALS — BODY MASS INDEX: 24.21 KG/M2 | WEIGHT: 150 LBS | DIASTOLIC BLOOD PRESSURE: 60 MMHG | SYSTOLIC BLOOD PRESSURE: 116 MMHG

## 2021-10-11 DIAGNOSIS — R10.2 PELVIC PAIN: ICD-10-CM

## 2021-10-11 DIAGNOSIS — R10.2 VAGINAL PAIN: Primary | ICD-10-CM

## 2021-10-11 PROCEDURE — 99212 OFFICE O/P EST SF 10 MIN: CPT | Performed by: OBSTETRICS & GYNECOLOGY

## 2021-10-11 NOTE — PATIENT INSTRUCTIONS
Hip Pain and Sex: Care Instructions  Your Care Instructions     It's common to be afraid to have sex when you have hip pain. But it doesn't have to stop you from having a satisfying sex life. Talk to your partner about which movements are comfortable for you and which aren't. Follow-up care is a key part of your treatment and safety. Be sure to make and go to all appointments, and call your doctor if you are having problems. It's also a good idea to know your test results and keep a list of the medicines you take. How can you care for yourself at home? · Learn which sexual positions may be good or bad for your hips. For example, some hip problems cause pain when you bend forward. Others cause problems when you lift your hips or arch your back. · Try positions you've never considered before. You may need to use a firmer surface than your mattress, such as a soft rug on the floor or even a sturdy chair. Oral sex might be easier than intercourse. · Go slow. Sex is like exercise--warming up and stretching first are important. Many people use yoga to gently stretch their muscles. When you're ready to have sex, keep your movements slow and gentle. · Take a hot shower to help relax your muscles. Or have your partner give you a massage. · Increase the time you and your partner spend in touching and caressing each other before sex (foreplay). · If it hurts, stop. That may seem obvious, but when things get passionate, it can be hard to stay in control. Try to keep it slow so that you can stop right away if your hips start to hurt. When should you call for help? Watch closely for changes in your health, and be sure to contact your doctor if:    · Your hip pain gets worse. Where can you learn more? Go to http://www.gray.com/  Enter B160 in the search box to learn more about \"Hip Pain and Sex: Care Instructions. \"  Current as of: July 1, 2021               Content Version: 13.0  © 9092-8949 [employer]. Care instructions adapted under license by [employer] (which disclaims liability or warranty for this information). If you have questions about a medical condition or this instruction, always ask your healthcare professional. [place] [address] any warranty or liability for your use of this information.